# Patient Record
(demographics unavailable — no encounter records)

---

## 2025-03-17 NOTE — PHYSICAL EXAM
[No Acute Distress] : no acute distress [Well-Appearing] : well-appearing [No Respiratory Distress] : no respiratory distress  [No Accessory Muscle Use] : no accessory muscle use [Clear to Auscultation] : lungs were clear to auscultation bilaterally [Normal Rate] : normal rate  [Regular Rhythm] : with a regular rhythm [Normal S1, S2] : normal S1 and S2 [No Murmur] : no murmur heard [Coordination Grossly Intact] : coordination grossly intact [No Focal Deficits] : no focal deficits [Normal Gait] : normal gait [Normal Affect] : the affect was normal [Normal Insight/Judgement] : insight and judgment were intact [de-identified] : congestion

## 2025-03-17 NOTE — PLAN
[FreeTextEntry1] : Rx sent. Pt advised to sign up for [facility] portal to review labs and communicate any questions or concerns directly. Yearly physical and return as needed for illness, medication refills, and new or existing complaints.

## 2025-07-02 NOTE — ASSESSMENT
[Patient Optimized for Surgery] : Patient optimized for surgery [Modify anticoagulant treatment prior to procedure] : Modify anticoagulant treatment prior to procedure [FreeTextEntry4] : CARDIAC CLEARANCE UNDER SEPARATE COVER [FreeTextEntry5] : Periop anticoag management as per cardiology

## 2025-07-02 NOTE — PHYSICAL EXAM
DISPLAY PLAN FREE TEXT
[Normal] : soft, non-tender, non-distended, no masses palpated, no HSM and normal bowel sounds

## 2025-07-02 NOTE — HISTORY OF PRESENT ILLNESS
[Atrial Fibrillation] : atrial fibrillation [(Patient denies any chest pain, claudication, dyspnea on exertion, orthopnea, palpitations or syncope)] : Patient denies any chest pain, claudication, dyspnea on exertion, orthopnea, palpitations or syncope [Moderate (4-6 METs)] : Moderate (4-6 METs) [FreeTextEntry1] : Bladder stone [FreeTextEntry2] : 7/7/25 [FreeTextEntry3] : Maria C [FreeTextEntry4] : seeing heme for low platlet count